# Patient Record
Sex: FEMALE | Race: ASIAN | NOT HISPANIC OR LATINO | URBAN - METROPOLITAN AREA
[De-identification: names, ages, dates, MRNs, and addresses within clinical notes are randomized per-mention and may not be internally consistent; named-entity substitution may affect disease eponyms.]

---

## 2020-12-31 ENCOUNTER — EMERGENCY (EMERGENCY)
Facility: HOSPITAL | Age: 35
LOS: 1 days | Discharge: HOME | End: 2020-12-31
Attending: EMERGENCY MEDICINE | Admitting: EMERGENCY MEDICINE
Payer: SELF-PAY

## 2020-12-31 VITALS
RESPIRATION RATE: 18 BRPM | SYSTOLIC BLOOD PRESSURE: 138 MMHG | WEIGHT: 149.91 LBS | TEMPERATURE: 98 F | DIASTOLIC BLOOD PRESSURE: 83 MMHG | OXYGEN SATURATION: 98 % | HEART RATE: 102 BPM

## 2020-12-31 VITALS
SYSTOLIC BLOOD PRESSURE: 130 MMHG | HEART RATE: 82 BPM | RESPIRATION RATE: 18 BRPM | DIASTOLIC BLOOD PRESSURE: 81 MMHG | OXYGEN SATURATION: 99 %

## 2020-12-31 DIAGNOSIS — M54.2 CERVICALGIA: ICD-10-CM

## 2020-12-31 DIAGNOSIS — G24.9 DYSTONIA, UNSPECIFIED: ICD-10-CM

## 2020-12-31 PROCEDURE — 99284 EMERGENCY DEPT VISIT MOD MDM: CPT

## 2020-12-31 RX ORDER — BENZTROPINE MESYLATE 1 MG
1 TABLET ORAL
Qty: 30 | Refills: 0
Start: 2020-12-31 | End: 2021-01-14

## 2020-12-31 RX ORDER — DIPHENHYDRAMINE HCL 50 MG
50 CAPSULE ORAL ONCE
Refills: 0 | Status: COMPLETED | OUTPATIENT
Start: 2020-12-31 | End: 2020-12-31

## 2020-12-31 RX ORDER — BENZTROPINE MESYLATE 1 MG
2 TABLET ORAL ONCE
Refills: 0 | Status: COMPLETED | OUTPATIENT
Start: 2020-12-31 | End: 2020-12-31

## 2020-12-31 RX ADMIN — Medication 50 MILLIGRAM(S): at 14:38

## 2020-12-31 RX ADMIN — Medication 2 MILLIGRAM(S): at 15:40

## 2020-12-31 NOTE — ED PROVIDER NOTE - OBJECTIVE STATEMENT
35 F presenting with neck and jaw spasms. No drooling. She started Haldol last night (3am) for Bipolar. She is not on other no on Bipolar Meds. She then drank EtOH all night. She had her first dystonic reaction. Her psychiatrist advised her to go to the ER for Cogentin. No other complaints.

## 2020-12-31 NOTE — ED ADULT TRIAGE NOTE - CHIEF COMPLAINT QUOTE
here for muscle twitching - states she drank too much last night and took haldol which was prescribed at [place] last night

## 2020-12-31 NOTE — ED PROVIDER NOTE - CLINICAL SUMMARY MEDICAL DECISION MAKING FREE TEXT BOX
Patient presenting with a dystonic reaction to haldol. Better after IM Benadryl. Counselled on medication use and risk or recurrent reaction with Haldol.

## 2020-12-31 NOTE — ED ADULT NURSE NOTE - CHIEF COMPLAINT QUOTE
here for muscle twitching - states she drank too much last night and took haldol which was prescribed at [place] last night

## 2020-12-31 NOTE — ED PROVIDER NOTE - PATIENT PORTAL LINK FT
You can access the FollowMyHealth Patient Portal offered by [facility] by registering at the following website: [email or web address]. By joining [employer]’s FollowMyHealth portal, you will also be able to view your health information using other applications (apps) compatible with our system.

## 2020-12-31 NOTE — ED PROVIDER NOTE - NSFOLLOWUPINSTRUCTIONS_ED_ALL_ED_FT
You had what is called a dystonic reaction to haldol. You will likely have this effect with Haldol use and need to take Cogentin (benztropine) with it 1-2 times per day or switch to a different category of medications. Please follow up with your psychiatrist.     Please list this as a medication reaction when asked about allergies.     If symptoms recur please take 1-2 mechanical fall of Cogentin.     Return to the ER for medical emergencies or crisis.

## 2020-12-31 NOTE — ED ADULT NURSE NOTE - OBJECTIVE STATEMENT
Pt presents to ED for dystonic reaction from ETOH abuse last night mixed with PO haldol that was px at [place] x2 nights ago. Pt presents with leg twitching and facial stiffness and twitching.

## 2021-01-07 ENCOUNTER — EMERGENCY (EMERGENCY)
Facility: HOSPITAL | Age: 36
LOS: 1 days | Discharge: HOME | End: 2021-01-07
Attending: EMERGENCY MEDICINE | Admitting: EMERGENCY MEDICINE
Payer: SELF-PAY

## 2021-01-07 VITALS
HEART RATE: 84 BPM | OXYGEN SATURATION: 98 % | TEMPERATURE: 99 F | SYSTOLIC BLOOD PRESSURE: 126 MMHG | RESPIRATION RATE: 16 BRPM | DIASTOLIC BLOOD PRESSURE: 74 MMHG | HEIGHT: 66 IN | WEIGHT: 119.93 LBS

## 2021-01-07 DIAGNOSIS — Z76.0 ENCOUNTER FOR ISSUE OF REPEAT PRESCRIPTION: ICD-10-CM

## 2021-01-07 DIAGNOSIS — F31.9 BIPOLAR DISORDER, UNSPECIFIED: ICD-10-CM

## 2021-01-07 PROCEDURE — 99283 EMERGENCY DEPT VISIT LOW MDM: CPT

## 2021-01-07 RX ORDER — LITHIUM CARBONATE 300 MG/1
1 TABLET, EXTENDED RELEASE ORAL
Qty: 60 | Refills: 0
Start: 2021-01-07 | End: 2021-02-05

## 2021-01-07 NOTE — ED PROVIDER NOTE - NSFOLLOWUPINSTRUCTIONS_ED_ALL_ED_FT
please take medications as directed and f/u with your psychiatrist for renewal of medication and monitoring of your lithium level.    Return to the emergency department if:   •You are confused.      •You are having trouble staying awake.      •You have signs of dehydration such as increased thirst, dark yellow urine, urinating little or not at all, or dry eyes or mouth.       •You have severe muscle spasms.       Contact your healthcare provider if:   •You have nausea, vomiting, abdominal pain, or diarrhea.      •You are shaky.      •Your muscles feel weak.       •You have questions or concerns about your condition or care.      How to safely take lithium:   •Take this medicine exactly as directed. Contact your healthcare provider if you miss a dose or you have any questions about how to take lithium.       •Drink liquids as directed. Ask how much liquid to drink each day and which liquids are best for you. Dehydration can increase your risk of lithium toxicity.       •Do not decrease the amount of salt you eat without talking to your healthcare provider. A decreased salt intake can increase your risk of lithium toxicity.      •Go to all your follow-up appointments. Your healthcare provider will need to monitor you closely while you are taking lithium. You will need regular blood tests.      What to do if you think you or someone you know took too much lithium: Call the Poison Control Center at [phone] immediately.     Follow up with your healthcare provider as directed: Write down your questions so you remember to ask them during your visits.

## 2021-01-07 NOTE — ED PROVIDER NOTE - PATIENT PORTAL LINK FT
You can access the FollowMyHealth Patient Portal offered by [facility] by registering at the following website: [email or web address]. By joining [employer]’s FollowMyHealth portal, you will also be able to view your health information using other applications (apps) compatible with our system.

## 2021-01-07 NOTE — ED ADULT TRIAGE NOTE - CHIEF COMPLAINT QUOTE
Pt w/ PMH of bipolar disorder presents on referral of psychiatrist for medication refill.  Pt denies SI/HI or hallucinations.  Pts thoughts are well-organized, pt is in company of mother.

## 2021-01-07 NOTE — ED PROVIDER NOTE - CLINICAL SUMMARY MEDICAL DECISION MAKING FREE TEXT BOX
Meds sent, no e/o acute psychosis. message left with psychiatrist. d/c home with return precautions.

## 2021-01-07 NOTE — ED PROVIDER NOTE - OBJECTIVE STATEMENT
34 y/o F w/hx Bipolar disorder, requesting lithium prescription as [place] is going [place] the [place] and will be otherwise without her medication, unable to get in contact with her psychiatrist at [facility], Dr. Higuera [phone]. Feeling in normal state of health. No HI/SI/AH/VH.

## 2023-06-02 NOTE — ED ADULT NURSE NOTE - FINAL NURSING ELECTRONIC SIGNATURE
I have called patient regarding potassium level. Per  potassium replacement 20mEq BID. Verified pharmacy with patient. 07-Jan-2021 14:30

## 2023-09-05 ENCOUNTER — NEW PATIENT COMPREHENSIVE (OUTPATIENT)
Dept: URBAN - METROPOLITAN AREA CLINIC 92 | Facility: CLINIC | Age: 38
End: 2023-09-05

## 2023-09-05 DIAGNOSIS — H52.13: ICD-10-CM

## 2023-09-05 PROCEDURE — 92015 DETERMINE REFRACTIVE STATE: CPT

## 2023-09-05 ASSESSMENT — TONOMETRY
OD_IOP_MMHG: 10
OS_IOP_MMHG: 10

## 2023-09-05 ASSESSMENT — VISUAL ACUITY
OS_SC: 20/20-3
OD_SC: 20/20-1

## 2023-09-05 ASSESSMENT — KERATOMETRY
OS_AXISANGLE_DEGREES: 180
OD_AXISANGLE_DEGREES: 180
OS_K2POWER_DIOPTERS: 46.00
OS_K1POWER_DIOPTERS: 44.75
OS_AXISANGLE2_DEGREES: 90
OD_K1POWER_DIOPTERS: 44.50
OD_K2POWER_DIOPTERS: 45.75
OD_AXISANGLE2_DEGREES: 90